# Patient Record
Sex: MALE | Race: WHITE | NOT HISPANIC OR LATINO | URBAN - METROPOLITAN AREA
[De-identification: names, ages, dates, MRNs, and addresses within clinical notes are randomized per-mention and may not be internally consistent; named-entity substitution may affect disease eponyms.]

---

## 2021-10-20 ENCOUNTER — OUT OF OFFICE VISIT (OUTPATIENT)
Dept: URBAN - METROPOLITAN AREA MEDICAL CENTER 1 | Facility: MEDICAL CENTER | Age: 41
End: 2021-10-20
Payer: COMMERCIAL

## 2021-10-20 DIAGNOSIS — D69.6 ACQUIRED THROMBOCYTOPENIA: ICD-10-CM

## 2021-10-20 DIAGNOSIS — F10.231 DELIRIUM TREMENS: ICD-10-CM

## 2021-10-20 DIAGNOSIS — D64.89 ANEMIA DUE TO OTHER CAUSE: ICD-10-CM

## 2021-10-20 DIAGNOSIS — K70.10 ACUTE ALCOHOLIC HEPATITIS: ICD-10-CM

## 2021-10-20 DIAGNOSIS — G93.40 ACUTE ENCEPHALOPATHY: ICD-10-CM

## 2021-10-20 PROCEDURE — G8427 DOCREV CUR MEDS BY ELIG CLIN: HCPCS | Performed by: INTERNAL MEDICINE

## 2021-10-20 PROCEDURE — 99222 1ST HOSP IP/OBS MODERATE 55: CPT | Performed by: INTERNAL MEDICINE

## 2021-10-20 PROCEDURE — 99232 SBSQ HOSP IP/OBS MODERATE 35: CPT | Performed by: INTERNAL MEDICINE

## 2021-10-23 ENCOUNTER — OUT OF OFFICE VISIT (OUTPATIENT)
Dept: URBAN - METROPOLITAN AREA MEDICAL CENTER 1 | Facility: MEDICAL CENTER | Age: 41
End: 2021-10-23
Payer: COMMERCIAL

## 2021-10-23 DIAGNOSIS — D69.6 ACQUIRED THROMBOCYTOPENIA: ICD-10-CM

## 2021-10-23 DIAGNOSIS — F10.231 DELIRIUM TREMENS: ICD-10-CM

## 2021-10-23 DIAGNOSIS — K70.10 ACUTE ALCOHOLIC HEPATITIS: ICD-10-CM

## 2021-10-23 DIAGNOSIS — K76.0 FATTY (CHANGE OF) LIVER: ICD-10-CM

## 2021-10-23 PROCEDURE — 99232 SBSQ HOSP IP/OBS MODERATE 35: CPT | Performed by: INTERNAL MEDICINE

## 2022-01-27 ENCOUNTER — OFFICE VISIT (OUTPATIENT)
Dept: URBAN - NONMETROPOLITAN AREA CLINIC 2 | Facility: CLINIC | Age: 42
End: 2022-01-27

## 2022-02-14 ENCOUNTER — WEB ENCOUNTER (OUTPATIENT)
Dept: URBAN - NONMETROPOLITAN AREA CLINIC 13 | Facility: CLINIC | Age: 42
End: 2022-02-14

## 2022-02-14 ENCOUNTER — OFFICE VISIT (OUTPATIENT)
Dept: URBAN - NONMETROPOLITAN AREA CLINIC 13 | Facility: CLINIC | Age: 42
End: 2022-02-14
Payer: COMMERCIAL

## 2022-02-14 VITALS
BODY MASS INDEX: 26.58 KG/M2 | SYSTOLIC BLOOD PRESSURE: 122 MMHG | HEART RATE: 89 BPM | TEMPERATURE: 97.7 F | HEIGHT: 68 IN | WEIGHT: 175.4 LBS | DIASTOLIC BLOOD PRESSURE: 85 MMHG

## 2022-02-14 DIAGNOSIS — K70.10 ALCOHOLIC HEPATITIS WITHOUT ASCITES: ICD-10-CM

## 2022-02-14 DIAGNOSIS — K70.30 ALCOHOLIC CIRRHOSIS OF LIVER WITHOUT ASCITES: ICD-10-CM

## 2022-02-14 PROCEDURE — 99214 OFFICE O/P EST MOD 30 MIN: CPT | Performed by: NURSE PRACTITIONER

## 2022-02-14 RX ORDER — SERTRALINE HYDROCHLORIDE 50 MG/1
TAKE ONE TABLET BY MOUTH ONE TIME DAILY AS DIRECTED TABLET ORAL
Qty: 30 | Refills: 0 | Status: ACTIVE | COMMUNITY

## 2022-02-14 RX ORDER — FLUOXETINE HYDROCHLORIDE 20 MG/1
TAKE ONE CAPSULE ONCE DAILY CAPSULE ORAL
Qty: 30 | Refills: 0 | Status: ACTIVE | COMMUNITY

## 2022-02-14 RX ORDER — QUETIAPINE 25 MG/1
TAKE ONE TABLET AT BEDTIME TABLET, FILM COATED ORAL
Qty: 30 | Refills: 0 | Status: ACTIVE | COMMUNITY

## 2022-02-14 RX ORDER — DIAZEPAM 10 MG/1
TAKE ONE TABLET BY MOUTH EVERY 12 HOURS FOR ONE DAY, THEN TAKE ONE TABLET BY MOUTH ONE TIME DAILY TABLET ORAL
Qty: 30 | Refills: 0 | Status: ACTIVE | COMMUNITY

## 2022-02-14 RX ORDER — CLORAZEPATE DIPOTASSIUM 7.5 MG/1
TAKE TWO TABS BY MOUTH FOUR TIMES A DAY FOR 2 DAYS THEN TAKE ONE TABLET BY MOUTH EVERY MORNING AND TAKE TWO TABS BY MOUTH AT BEDTIME FOR 2 D TABLET ORAL
Qty: 27 | Refills: 0 | Status: ACTIVE | COMMUNITY

## 2022-02-14 RX ORDER — NALTREXONE HYDROCHLORIDE 50 MG/1
TABLET, FILM COATED ORAL
Qty: 30 | Status: ACTIVE | COMMUNITY

## 2022-02-14 RX ORDER — PREDNISONE 20 MG/1
TAKE TWO TABLETS BY MOUTH ONE TIME DAILY FOR 7 DAYS TABLET ORAL
Qty: 14 | Refills: 0 | Status: ACTIVE | COMMUNITY

## 2022-02-14 RX ORDER — CEFDINIR 300 MG/1
TAKE TWO CAPSULES BY MOUTH ONE TIME DAILY FOR 10 DAYS CAPSULE ORAL
Qty: 20 | Refills: 0 | Status: ACTIVE | COMMUNITY

## 2022-02-14 RX ORDER — LACTULOSE 10 G/15ML
TAKE 30 ML BY MOUTH TWO TIMES A DAY SOLUTION ORAL
Qty: 1800 | Refills: 0 | Status: ACTIVE | COMMUNITY

## 2022-02-14 RX ORDER — LISINOPRIL AND HYDROCHLOROTHIAZIDE 12.5; 2 MG/1; MG/1
TAKE ONE TABLET BY MOUTH ONE TIME DAILY TABLET ORAL
Qty: 30 | Refills: 0 | Status: ACTIVE | COMMUNITY

## 2022-02-14 RX ORDER — FOLIC ACID 1 MG/1
TAKE ONE TABLET BY MOUTH ONE TIME DAILY TABLET ORAL
Qty: 30 | Refills: 0 | Status: ACTIVE | COMMUNITY

## 2022-02-14 RX ORDER — OLANZAPINE AND FLUOXETINE 6; 25 MG/1; MG/1
TAKE ONE CAPSULE BY MOUTH AT BEDTIME CAPSULE ORAL
Qty: 30 | Refills: 0 | Status: ACTIVE | COMMUNITY

## 2022-02-14 RX ORDER — LORAZEPAM 1 MG/1
TAKE ONE TABLET TONIGHT, THEN TAKE ONE TABLET TWICE DAILY FOR TWO DAYS, TAKE ONE TABLET AT BEDTIME FOR TWO DAYS, THEN TAKE 1/2 TABLET AT BEDTIME FOR TWO DAYS TABLET ORAL
Qty: 8 | Refills: 0 | Status: ACTIVE | COMMUNITY

## 2022-02-14 RX ORDER — HYDROXYZINE HYDROCHLORIDE 25 MG/1
TAKE ONE TABLET BY MOUTH THREE TIMES A DAY AS NEEDED FOR ANXIETY TABLET, FILM COATED ORAL
Qty: 30 | Refills: 0 | Status: ACTIVE | COMMUNITY

## 2022-02-14 RX ORDER — MULTIPLE VITAMINS W/ MINERALS TAB 9MG-400MCG
TAKE ONE TABLET BY MOUTH ONE TIME DAILY TAB ORAL
Qty: 30 | Refills: 0 | Status: ACTIVE | COMMUNITY

## 2022-02-14 RX ORDER — OLANZAPINE 5 MG/1
DISSOLVE ONE TABLET ON THE TONGUE TWICE A DAY TABLET, ORALLY DISINTEGRATING ORAL
Qty: 60 | Refills: 0 | Status: ACTIVE | COMMUNITY

## 2022-02-14 RX ORDER — ATORVASTATIN CALCIUM 40 MG/1
TAKE ONE TABLET BY MOUTH EVERY NIGHT TABLET, FILM COATED ORAL
Qty: 30 | Refills: 0 | Status: ACTIVE | COMMUNITY

## 2022-02-14 RX ORDER — METHION/INOS/CHOL BT/B COM/LIV 110MG-86MG
TAKE ONE TABLET BY MOUTH ONE TIME DAILY CAPSULE ORAL
Qty: 30 | Refills: 0 | Status: ACTIVE | COMMUNITY

## 2022-02-14 RX ORDER — CLONIDINE HYDROCHLORIDE 0.1 MG/1
TAKE ONE-HALF TABLET BY MOUTH THREE TIMES A DAY FOR 10 DAYS TABLET ORAL
Qty: 15 | Refills: 0 | Status: ACTIVE | COMMUNITY

## 2022-02-14 RX ORDER — AMLODIPINE BESYLATE 5 MG/1
TAKE ONE TABLET EVERY DAY TABLET ORAL
Qty: 30 | Refills: 0 | Status: ACTIVE | COMMUNITY

## 2022-02-14 NOTE — HPI-TODAY'S VISIT:
Mane is a 40 yo M who presents for alcoholic hepatiits hospital f/u. He was hospitalized in Oct and early Jan for this. Today, he is doing well. no ascites or HE. He has been in an inpt rehab treatment facility for the last month and has transitioned to outpt. He has been sober the last 6 weeks. Bili was as high as 9 inpt, but pt reports recent labs with PCP showed normal LFTs. SB MRCP 10/22/21 with hepatomegaly of 20.3cm and severe hepatic steatosis but no evidence of biliary obstruction. Splenomegaly noted.

## 2022-02-18 PROBLEM — 420054005: Status: ACTIVE | Noted: 2022-02-14

## 2022-02-18 PROBLEM — 235875008: Status: ACTIVE | Noted: 2022-02-14

## 2022-02-23 ENCOUNTER — OFFICE VISIT (OUTPATIENT)
Dept: URBAN - METROPOLITAN AREA MEDICAL CENTER 1 | Facility: MEDICAL CENTER | Age: 42
End: 2022-02-23
Payer: COMMERCIAL

## 2022-02-23 DIAGNOSIS — K29.30 CHRONIC SUPERFICIAL GASTRITIS: ICD-10-CM

## 2022-02-23 DIAGNOSIS — K22.89 OTHER SPECIFIED DISEASE OF ESOPHAGUS: ICD-10-CM

## 2022-02-23 PROCEDURE — 43239 EGD BIOPSY SINGLE/MULTIPLE: CPT | Performed by: INTERNAL MEDICINE

## 2022-02-28 ENCOUNTER — OFFICE VISIT (OUTPATIENT)
Dept: URBAN - NONMETROPOLITAN AREA CLINIC 13 | Facility: CLINIC | Age: 42
End: 2022-02-28

## 2022-03-15 ENCOUNTER — DASHBOARD ENCOUNTERS (OUTPATIENT)
Age: 42
End: 2022-03-15

## 2022-03-22 ENCOUNTER — OFFICE VISIT (OUTPATIENT)
Dept: URBAN - METROPOLITAN AREA TELEHEALTH 2 | Facility: TELEHEALTH | Age: 42
End: 2022-03-22

## 2022-03-22 RX ORDER — SERTRALINE HYDROCHLORIDE 50 MG/1
TAKE ONE TABLET BY MOUTH ONE TIME DAILY AS DIRECTED TABLET ORAL
Qty: 30 | Refills: 0 | Status: ACTIVE | COMMUNITY

## 2022-03-22 RX ORDER — QUETIAPINE 25 MG/1
TAKE ONE TABLET AT BEDTIME TABLET, FILM COATED ORAL
Qty: 30 | Refills: 0 | Status: ACTIVE | COMMUNITY

## 2022-03-22 RX ORDER — AMLODIPINE BESYLATE 5 MG/1
TAKE ONE TABLET EVERY DAY TABLET ORAL
Qty: 30 | Refills: 0 | Status: ACTIVE | COMMUNITY

## 2022-03-22 RX ORDER — CEFDINIR 300 MG/1
TAKE TWO CAPSULES BY MOUTH ONE TIME DAILY FOR 10 DAYS CAPSULE ORAL
Qty: 20 | Refills: 0 | Status: ACTIVE | COMMUNITY

## 2022-03-22 RX ORDER — NALTREXONE HYDROCHLORIDE 50 MG/1
TABLET, FILM COATED ORAL
Qty: 30 | Status: ACTIVE | COMMUNITY

## 2022-03-22 RX ORDER — HYDROXYZINE HYDROCHLORIDE 25 MG/1
TAKE ONE TABLET BY MOUTH THREE TIMES A DAY AS NEEDED FOR ANXIETY TABLET, FILM COATED ORAL
Qty: 30 | Refills: 0 | Status: ACTIVE | COMMUNITY

## 2022-03-22 RX ORDER — OLANZAPINE 5 MG/1
DISSOLVE ONE TABLET ON THE TONGUE TWICE A DAY TABLET, ORALLY DISINTEGRATING ORAL
Qty: 60 | Refills: 0 | Status: ACTIVE | COMMUNITY

## 2022-03-22 RX ORDER — MULTIPLE VITAMINS W/ MINERALS TAB 9MG-400MCG
TAKE ONE TABLET BY MOUTH ONE TIME DAILY TAB ORAL
Qty: 30 | Refills: 0 | Status: ACTIVE | COMMUNITY

## 2022-03-22 RX ORDER — FLUOXETINE HYDROCHLORIDE 20 MG/1
TAKE ONE CAPSULE ONCE DAILY CAPSULE ORAL
Qty: 30 | Refills: 0 | Status: ACTIVE | COMMUNITY

## 2022-03-22 RX ORDER — CLORAZEPATE DIPOTASSIUM 7.5 MG/1
TAKE TWO TABS BY MOUTH FOUR TIMES A DAY FOR 2 DAYS THEN TAKE ONE TABLET BY MOUTH EVERY MORNING AND TAKE TWO TABS BY MOUTH AT BEDTIME FOR 2 D TABLET ORAL
Qty: 27 | Refills: 0 | Status: ACTIVE | COMMUNITY

## 2022-03-22 RX ORDER — LISINOPRIL AND HYDROCHLOROTHIAZIDE 12.5; 2 MG/1; MG/1
TAKE ONE TABLET BY MOUTH ONE TIME DAILY TABLET ORAL
Qty: 30 | Refills: 0 | Status: ACTIVE | COMMUNITY

## 2022-03-22 RX ORDER — LACTULOSE 10 G/15ML
TAKE 30 ML BY MOUTH TWO TIMES A DAY SOLUTION ORAL
Qty: 1800 | Refills: 0 | Status: ACTIVE | COMMUNITY

## 2022-03-22 RX ORDER — ATORVASTATIN CALCIUM 40 MG/1
TAKE ONE TABLET BY MOUTH EVERY NIGHT TABLET, FILM COATED ORAL
Qty: 30 | Refills: 0 | Status: ACTIVE | COMMUNITY

## 2022-03-22 RX ORDER — OLANZAPINE AND FLUOXETINE 6; 25 MG/1; MG/1
TAKE ONE CAPSULE BY MOUTH AT BEDTIME CAPSULE ORAL
Qty: 30 | Refills: 0 | Status: ACTIVE | COMMUNITY

## 2022-03-22 RX ORDER — METHION/INOS/CHOL BT/B COM/LIV 110MG-86MG
TAKE ONE TABLET BY MOUTH ONE TIME DAILY CAPSULE ORAL
Qty: 30 | Refills: 0 | Status: ACTIVE | COMMUNITY

## 2022-03-22 RX ORDER — PREDNISONE 20 MG/1
TAKE TWO TABLETS BY MOUTH ONE TIME DAILY FOR 7 DAYS TABLET ORAL
Qty: 14 | Refills: 0 | Status: ACTIVE | COMMUNITY

## 2022-03-22 RX ORDER — CLONIDINE HYDROCHLORIDE 0.1 MG/1
TAKE ONE-HALF TABLET BY MOUTH THREE TIMES A DAY FOR 10 DAYS TABLET ORAL
Qty: 15 | Refills: 0 | Status: ACTIVE | COMMUNITY

## 2022-03-22 RX ORDER — FOLIC ACID 1 MG/1
TAKE ONE TABLET BY MOUTH ONE TIME DAILY TABLET ORAL
Qty: 30 | Refills: 0 | Status: ACTIVE | COMMUNITY

## 2022-03-22 RX ORDER — DIAZEPAM 10 MG/1
TAKE ONE TABLET BY MOUTH EVERY 12 HOURS FOR ONE DAY, THEN TAKE ONE TABLET BY MOUTH ONE TIME DAILY TABLET ORAL
Qty: 30 | Refills: 0 | Status: ACTIVE | COMMUNITY

## 2022-03-22 RX ORDER — LORAZEPAM 1 MG/1
TAKE ONE TABLET TONIGHT, THEN TAKE ONE TABLET TWICE DAILY FOR TWO DAYS, TAKE ONE TABLET AT BEDTIME FOR TWO DAYS, THEN TAKE 1/2 TABLET AT BEDTIME FOR TWO DAYS TABLET ORAL
Qty: 8 | Refills: 0 | Status: ACTIVE | COMMUNITY